# Patient Record
Sex: MALE | Race: WHITE | URBAN - METROPOLITAN AREA
[De-identification: names, ages, dates, MRNs, and addresses within clinical notes are randomized per-mention and may not be internally consistent; named-entity substitution may affect disease eponyms.]

---

## 2023-05-09 ENCOUNTER — APPOINTMENT (RX ONLY)
Dept: URBAN - METROPOLITAN AREA CLINIC 318 | Facility: CLINIC | Age: 68
Setting detail: DERMATOLOGY
End: 2023-05-09

## 2023-05-09 PROBLEM — C44.311 BASAL CELL CARCINOMA OF SKIN OF NOSE: Status: ACTIVE | Noted: 2023-05-09

## 2023-05-09 PROCEDURE — 99203 OFFICE O/P NEW LOW 30 MIN: CPT

## 2023-05-09 PROCEDURE — ? CONSULTATION FOR MOHS SURGERY

## 2023-05-09 PROCEDURE — ? SURGICAL DECISION MAKING

## 2023-05-09 NOTE — HPI: MOHS SURGERY CONSULTATION
Has The Cancer Been Biopsied Before?: has been previously biopsied
Body Location Override (Optional): Nasal tip
When Was Your Biopsy?: 4/18/23
Accession (Optional): [id number]
Who Is Your Referring Provider?: O Aremu

## 2023-05-09 NOTE — PROCEDURE: SURGICAL DECISION MAKING
Initial Decision For Surgery?: Yes
Complexity (Necessary For Coding; Major - 90 Day Global With Some Exceptions; Minor - 10 Day Global): major
Discussion: Discussed associated risks and benefits and common scenarios for this tumor and location.
Risk Assessment Explanation (Does Not Render In The Note): Clinical determination of the probability and/or consequences of an event, such as surgery. Clinical assessment of the level of risk is affected by the nature of the event under consideration for the patient. Modifier 57 is used to indicate an Evaluation and Management (E/M) service resulted in the initial decision to perform surgery either the day before a major surgery (90 day global) or the day of a major surgery.

## 2023-06-07 ENCOUNTER — APPOINTMENT (RX ONLY)
Dept: URBAN - METROPOLITAN AREA CLINIC 318 | Facility: CLINIC | Age: 68
Setting detail: DERMATOLOGY
End: 2023-06-07

## 2023-06-07 PROBLEM — C44.311 BASAL CELL CARCINOMA OF SKIN OF NOSE: Status: ACTIVE | Noted: 2023-06-07

## 2023-06-07 PROCEDURE — 17311 MOHS 1 STAGE H/N/HF/G: CPT

## 2023-06-07 PROCEDURE — ? MOHS SURGERY

## 2023-06-07 PROCEDURE — ? MEDICARE ABN

## 2023-06-07 NOTE — PROCEDURE: MOHS SURGERY
Problem: Patient Care Overview  Goal: Plan of Care Review  Outcome: Ongoing (interventions implemented as appropriate)  No falls, trauma or injury this shift. Pain managed with prescribed medications. Patient turned every two hours to help prevent pressure ulcers. Continue to monitor patient and continue with plan of care.       Biopsy Photograph Reviewed: Yes

## 2023-06-07 NOTE — PROCEDURE: MEDICARE ABN
Procedure (Limit To 20 Characters): Mohs
Detail Level: Detailed
Payment Option: Option 1: Bill Medicare, await for decision on payment.
Reason?: Additional Information
Reason?: non-covered service